# Patient Record
Sex: MALE | Race: WHITE
[De-identification: names, ages, dates, MRNs, and addresses within clinical notes are randomized per-mention and may not be internally consistent; named-entity substitution may affect disease eponyms.]

---

## 2020-05-22 ENCOUNTER — HOSPITAL ENCOUNTER (OUTPATIENT)
Dept: HOSPITAL 88 - NM | Age: 40
End: 2020-05-22
Attending: INTERNAL MEDICINE
Payer: COMMERCIAL

## 2020-05-22 DIAGNOSIS — R07.9: Primary | ICD-10-CM

## 2020-05-22 PROCEDURE — 78452 HT MUSCLE IMAGE SPECT MULT: CPT

## 2020-05-22 PROCEDURE — 93017 CV STRESS TEST TRACING ONLY: CPT

## 2020-05-26 NOTE — OPERATIVE REPORT
DATE OF PROCEDURE:  05/22/2020

 

SURGEON:  Galdino Rosas MD

 

PROCEDURE PERFORMED:  Exercise nuclear stress test report.

 

INDICATION:  Chest pain.

 

PROCEDURE:  The patient was stressed using a standard [employer] treadmill protocol.  Rest

and stress Myoview imaging was obtained. 

 

Resting heart rate 78 beats and resting blood pressure 141/102.  The patient exercised

for 10 minutes and 45 seconds.  Maximum heart rate 148 beats per minute corresponding to

83% of predicted maximum blood pressure 188/84, oxygen consumption 10.1 METS. 

 

No EKG changes were noted.  Rest imaging is normal.  Stress imaging shows mild decrease

in tracer uptake in the LV apex.  Normal contractility of the left ventricle. 

 

CONCLUSIONS:  

1. Normal EKG, clinical and hemodynamic treadmill stress test.

2. Abnormal myocardial perfusion study showing mild disk apical ischemia.

3. LV ejection fraction is 61%.

 

ACCESSION NUMBER:  For this study is [id number].

 

 

 

 

______________________________

Galdino Rosas MD

 

[facility]

D:  05/26/2020 14:19:21

T:  05/26/2020 15:55:18

Job #:  [id number]